# Patient Record
(demographics unavailable — no encounter records)

---

## 2024-10-18 NOTE — HISTORY OF PRESENT ILLNESS
[Preoperative Visit] : for a medical evaluation prior to surgery [Scheduled Procedure ___] : a [unfilled] [Date of Surgery ___] : on [unfilled] [Surgeon Name ___] : surgeon: [unfilled] [Cardiovascular Disease] : cardiovascular disease [Prior Anesthesia] : Prior anesthesia [Electrocardiogram] : ~T an ECG ~C was performed [Unable to Assess] : Unable to Assess [Fever] : no fever [Chills] : no chills [Fatigue] : no fatigue [Chest Pain] : no chest pain [Cough] : no cough [Dyspnea] : no dyspnea [Diabetes] : no diabetes [Pulmonary Disease] : no pulmonary disease [Anti-Platelet Agents] : no anti-platelet agents [Nicotine Dependence] : no nicotine dependence [Prev Anesthesia Reaction] : no previous anesthesia reaction [de-identified] : Fax: [phone] [FreeTextEntry1] : Debbie 82yo lady with a PMH of a metallic [facility] [facility] MV 1997 at [facility] s/p [facility] 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  Echo normal per pt report earlier this year.   Office interrogation w/ PPM battery at EOL w/ <3 months left. She feels well; no CP/dyspnea/palps/syncope. Last INR 3.2 as per daughter.    10/18/2024 PT is here today for preop clearance, INR yesterday was 2.8  1mgx 3days 2mg x4 days  no chest pain, sob, palpitations  labs today  As per patient, surgeon advised pt that she doesn't need to come off the anticoagulant  No chest pain, sob, palpitations

## 2024-10-18 NOTE — DISCUSSION/SUMMARY
[Procedure Low Risk] : the procedure risk is low [Patient Intermediate Risk] : the patient is an intermediate risk [Optimized for Surgery] : the patient is optimized for surgery [As per surgery] : as per surgery [Continue] : Continue medications as currently directed [FreeTextEntry1] : Debbie 82yo lady with a PMH of a metallic [facility] [facility] MV 1997 at [facility], afib s/p [facility] 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    Office interrogation w/ PPM battery at EOL w/ <3 months left. She feels well; no CP/dyspnea/palps/syncope. Last INR 3.2 as per daughter. 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   [EKG obtained to assist in diagnosis and management of assessed problem(s)] : EKG obtained to assist in diagnosis and management of assessed problem(s)

## 2024-10-18 NOTE — HISTORY OF PRESENT ILLNESS
[Preoperative Visit] : for a medical evaluation prior to surgery [Scheduled Procedure ___] : a [unfilled] [Date of Surgery ___] : on [unfilled] [Surgeon Name ___] : surgeon: [unfilled] [Cardiovascular Disease] : cardiovascular disease [Prior Anesthesia] : Prior anesthesia [Electrocardiogram] : ~T an ECG ~C was performed [Unable to Assess] : Unable to Assess [Fever] : no fever [Chills] : no chills [Fatigue] : no fatigue [Chest Pain] : no chest pain [Cough] : no cough [Dyspnea] : no dyspnea [Diabetes] : no diabetes [Pulmonary Disease] : no pulmonary disease [Anti-Platelet Agents] : no anti-platelet agents [Nicotine Dependence] : no nicotine dependence [Prev Anesthesia Reaction] : no previous anesthesia reaction [de-identified] : Fax: [phone] [FreeTextEntry1] : Debbie 84yo lady with a PMH of a metallic [facility] [facility] MV 1997 at [facility] s/p [facility] 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  Echo normal per pt report earlier this year.   Office interrogation w/ PPM battery at EOL w/ <3 months left. She feels well; no CP/dyspnea/palps/syncope. Last INR 3.2 as per daughter.    10/18/2024 PT is here today for preop clearance, INR yesterday was 2.8  1mgx 3days 2mg x4 days  no chest pain, sob, palpitations  labs today  As per patient, surgeon advised pt that she doesn't need to come off the anticoagulant  No chest pain, sob, palpitations

## 2024-10-18 NOTE — DISCUSSION/SUMMARY
[Procedure Low Risk] : the procedure risk is low [Patient Intermediate Risk] : the patient is an intermediate risk [Optimized for Surgery] : the patient is optimized for surgery [As per surgery] : as per surgery [Continue] : Continue medications as currently directed [FreeTextEntry1] : Debbie 84yo lady with a PMH of a metallic [facility] [facility] MV 1997 at [facility], afib s/p [facility] 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    Office interrogation w/ PPM battery at EOL w/ <3 months left. She feels well; no CP/dyspnea/palps/syncope. Last INR 3.2 as per daughter. 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   [EKG obtained to assist in diagnosis and management of assessed problem(s)] : EKG obtained to assist in diagnosis and management of assessed problem(s)

## 2024-11-01 NOTE — PHYSICAL EXAM
[Normal] : normal rate, regular rhythm, normal S1 and S2 and no murmur heard [No Varicosities] : no varicosities [Pedal Pulses Present] : the pedal pulses are present [No Edema] : there was no peripheral edema [No Extremity Clubbing/Cyanosis] : no extremity clubbing/cyanosis [Soft] : abdomen soft [Non Tender] : non-tender [Non-distended] : non-distended [Normal Bowel Sounds] : normal bowel sounds [de-identified] : visible contusions / hematomas noted on the left forehead, periorbital region, anterior neck, nose.

## 2024-11-01 NOTE — ASSESSMENT
[FreeTextEntry1] : Patient accompanied by daughter who is helping give history for the patient.  Daughter states that the patient was coming down the stairs at 1 AM on 10-29-24 when she fell down the stairs. States that she was taken to the emergency room via ambulance at that point.  States that in the ER imaging was done which is copied and pasted below  "IMPRESSION:  CT HEAD: 1.  No evidence of acute intracranial hemorrhage or midline shift. 2.  Chronic small vessel disease. 3.  Left frontal scalp/periorbital hematoma and contusion.  CT FACIAL BONES:  1.  Small left nasal bone fracture. 2.  Small multiple periapical lucencies scattered throughout the maxillary and mandibular teeth. Recommend dental examination for periodontal disease.  CT CERVICAL SPINE: 1.  No evidence of acute osseous fracture or jorge dislocation. 2.  Multilevel degenerative change of the cervical spine, most significantly at the C4-C5 level where there is moderate to severe narrowing of the spinal canal."  At this point recommended pain control as above noted and follow-up with ENT.  HISTORY OF PRESENT ILLNESS: International Travel: International Travel within 21 days? No.(1)  Patient Identity: - Patient's Sexual Orientation Withheld/Decline to Answer  Child Abuse Assessment (patients less than 13 yrs): [facility].  Chief Complaint: head inj sp fall.  - Chief Complaint: The patient is a 83y Female complaining of - HPI Objective Statement: see [facility]  HIV: HIV Test Questions: - In accordance with [place] State law, we offer every patient who comes to our ED an HIV test. Would you like to be tested today? Opt out  HEPATITIS C TEST QUESTIONS: Hepatitis C Test Questions: - In accordance with [place] State Law, we offer every patient a Hepatitis C test. Would you like to be tested today? Opt out  PAST MEDICAL/SURGICAL/FAMILY/SOCIAL HISTORY: Tobacco Usage: - Tobacco Usage Unknown if ever smoked  ALLERGIES AND HOME MEDICATIONS: Allergies: Allergies: sulfa drugs: Drug Category, Hives penicillin: Drug, Unknown codeine: Drug, Unknown, shaky and sweaty  Home Medications: * Outpatient Medication Status not yet specified  PHYSICAL EXAM: - Physical Examination: see [facility]  CURRENT ORDERS/: - ketorolac Injectable, [Ordered as TORADOL Injectable] 15 milliGRAM(s), IV Push, Once, Stop After 1 Doses Administration Instructions: IF IV PUSH - Administer over 1 minute. Dispose unused medication in BLACK bin., 29-Oct-2024, Active, Standard - Blood Glucose Point Of Care Testing, Frequency: once, 29-Oct-2024, Active, Standard - Cardiac Monitor Bedside, Time/Priority: STAT, 29-Oct-2024, Active, Standard - IV Insert, Time/Priority: STAT Additional Instructions: Peripheral Line 1, 29-Oct-2024, Active, Standard - Pulse Oximetry, Frequency: <Continuous>, 29-Oct-2024, Active, Standard - Vital Signs, Frequency: per routine, 29-Oct-2024, Active, Standard - Temperature Rectal Only, Frequency: Once, 29-Oct-2024, Active, Standard  RESULTS: Wet Read: There are no Wet Read(s) to document.  (1) Order Name: Xray Chest 1 View AP/PA Order ID: [id number] Order Date/Time: 29-Oct-2024 02:36 Order Status: Resulted.  (2) Order Name: Xray Pelvis AP only Order ID: [id number] Order Date/Time: 29-Oct-2024 02:36 Order Status: Resulted.  PROGRESS NOTE: Date: 29-Oct-2024 07:36.  Progress: Mark Mena DO (PGY3) Received signout on this patient, 83-year-old female past medical history of mitral valve replacement on Coumadin presenting as a mechanical fall while going down the stairs. 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 Patient pending urine and CT scans of head and maxillofacial region. If negative, will discharge home with family. Final disposition pending, patient otherwise hemodynamically stable.  Progress: Mark Mena DO (PGY3) Patient with left nasal bone fracture, will give ENT follow-up. Patient with white blood cells on urinalysis however no dysuria. Will treat prophylactically with antibiotics for UTI. Encouraged daughter to follow-up with ENT as well as PCP.  DISPOSITION: Care Plan - Instructions: Principal Discharge DX: Closed head injury Secondary Diagnosis: Nasal bone fracture.  Impression: 1.  Principal Discharge Dx Closed head injury.  Secondary Discharge Dx Nasal bone fracture.  Medical Decision Making: - The following orders were submitted: Imaging Studies, Medications - Clinical Summary (MDM): Summarize the clinical encounter This 83-year-old female is coming in with a sulfa, codeine, penicillin allergy, is currently on warfarin, valve replacement, presenting with fall. Patient had mechanical fall it sounds like, patient went down onto her knees, they were pain to bilateral. Patient did not take any medication. Patient fell alone and was down for an extended period of time. Place and had no chest pain no shortness of breath, no abdominal tenderness to palpation. Feeling well in good health otherwise. Patient with headache status post fall, no LOC.  cranial nerves II through XII intact, patient with abrasions just above the L eyebrow, w punctate area just lateral to abrasions, bleeding staunched. patient with straight leg bilateral, patient with sensation and motor grossly intact in the upper and lower extremities without any gross deformities, no crepitus noted. Patient with normal S1-S2, there is no tenderness to the chest wall, there is no abdominal tenderness to palpation. There is no pelvic instability.  Not concerned for entrapment. Plan for CT max face, plan for CBC CMP, basic labs. Plan for pain control, likely mechanical in nature. pending dispo - Follow-up Instructions (will be supplied to the patient only if discharged) Symptom Relief For simple breaks, in which the nasal bone is not crooked, in order to keep pain and swelling down, your provider may advise you to:  Take relative rest. Try to keep away from any activity where you could bump your nose. Ice your nose for 20 minutes, every 1 to 2 hours while awake. Do not apply ice directly to the skin. Take pain medicine if necessary. Keep your head elevated to help reduce swelling and improve breathing. For pain, you can take ibuprofen (Advil, Motrin) or Tylenol You can buy these pain medicines without a prescription.  Talk with your provider before using these medicines if you have heart disease, high blood pressure, kidney disease, liver disease, or have had stomach ulcers or bleeding. Do not take more than the amount recommended on the bottle. Do not give aspirin to children.  Please follow up with an ENT specialist and your PCP. You will be called to set up an appointment with the ENT specialist - Follow-up care needed for Referral Management Specialty Care (immediate)... - Specialty: Otolaryngology (ENT) - Enter additional information that will help with when scheduling additional follow-up care f/u for nasal bone fracture  Disposition: Disposition: DISCHARGE.  Patient requests all Lab, Cardiology, and Radiology Results on their Discharge Instructions.  <-------- Click here to INCLUDE [facility]19 Discharge Instructions.  Discharge Disposition: Home.  Discharge Date: 29-Oct-2024.  Condition at Discharge: Satisfactory.  Patient ready for discharge: Patient/Caregiver provided printed discharge information.  You can access the Commutable Patient Portal offered by [email or web address] by registering at the following website: [email or web address] By joining [email or web address] portal, you will also be able to view your health information using other applications (apps) compatible with our system.  Prescriptions: * Outpatient Medication Status not yet specified  PROVIDER CARE INITIATION: - Care Initiated by: Elton Rocha(Attending) - Provider Care Initiated at: 29-Oct-2024 02:16   Electronic Signatures: Layton Abreu) (Signed 29-Oct-2024 04:34) Authored: HISTORY OF PRESENT ILLNESS, HIV, HEPATITIS C TEST QUESTIONS, PAST MEDICAL/SURGICAL/FAMILY/SOCIAL HISTORY, ALLERGIES AND HOME MEDICATIONS, PHYSICAL EXAM, CURRENT ORDERS/, RESULTS, DISPOSITION, STROKE, PROVIDER CARE INITIATION Mark Mena) (Signed 29-Oct-2024 10:29) Authored: RESULTS, PROGRESS NOTE, DISPOSITION Elton Rocha) (Signature Pending) Co-Signer: HISTORY OF PRESENT ILLNESS, HIV, HEPATITIS C TEST QUESTIONS, PAST MEDICAL/SURGICAL/FAMILY/SOCIAL HISTORY, ALLERGIES AND HOME MEDICATIONS, PHYSICAL EXAM, CURRENT ORDERS/, RESULTS, DISPOSITION, STROKE, PROVIDER CARE INITIATION   Last Updated: 29-Oct-2024 10:29 by Mark Mena)  References: 1. Data Referenced From "ED ADULT Triage Note" 29-Oct-2024 02:04   Patient had chest x-ray done on 10-29-24 that was read by radiology as  "FINDINGS: Left-sided cardiac device with leads in the right atrium and right ventricle. Aortic valve replacement. The heart is normal in size. No focal consolidations There is no pneumothorax or pleural effusion. No acute bony abnormality.  IMPRESSION: Clear lungs."     Patient had Pelvic X-Ray done on 10-29-24 that was read by radiology as  "FINDINGS:  No acute fracture. No dislocation. Mild bilateral hip joint space narrowing, without fracture or avascular necrosis. There is lower lumbar spondylosis which is incompletely imaged/evaluated with transitional lumbosacral anatomy.  IMPRESSION: No acute fracture or dislocation"  We will do routine blood work in the form of CBC, CMP, A1c, lipid, TSH, B12 folate at this point. she may follow-up earlier if needed Pt was told to call with problems or concerns. The patient was told to seek immediate attention by calling 911 or going to the ER if her condition does not improve or gets acutely worse.

## 2024-11-01 NOTE — HISTORY OF PRESENT ILLNESS
[FreeTextEntry1] : check up [de-identified] : 83-year-old female is here for checkup.  This is my first time seeing this patient.   Patient denies any fevers, chills, nausea, vomiting, diarrhea, constipation, chest pain, shortness of breath, weakness, numbness, tingling at this time.   Patient reports that she has had 1 COVID vaccines in the past.   Alcohol: Endorses. Patient reports that they drink alcohol occasionally (once a month). Smoking: Denies. Patient reports that they have never smoked cigarettes. Illicit Drugs: Denies. Patient reports that they do not use any recreational drugs. Colonoscopy: Patient reports that their last colonoscopy was in 2004. They report that the colonoscopy was normal.  Patient refused undergo screening colonoscopy at this point.  Educated the patient about the harms and side effects of not undergoing routine colon cancer screening including missing colon cancer, colon polyps, etc.  He states he understands the risks and harms of not undergoing routine colon cancer screening but refuses to undergo colon cancer screening/screening colonoscopy at this point Mammogram:  Patient reports that their last mammogram was in 05/2021.  They report that the mammogram was normal.  She states she does have an OB/GYN.  States her OB/GYN gives her her mammogram prescriptions.  States she prefers to have her OB/GYN give her her mammogram and US breast prescriptions at this point. PAP smear:  Patient reports that their last PAP smear was in 2018. They report that the PAP smear was normal.  DEXA: Patient reports that they never had a bone density scan. Patient refused to undergo DEXA scan at this point.  Diet: Patient reports that they are good with diet. Exercise: Patient reports that they are okay with exercise Living Situation: Family.  Patient reports that they live with her son. Employment Status: Retired.  Patient reports that they were a  in the past.  Education: Reports that the highest level of education is High school. Relationship Status: . Number of kids : 2. ADLs: Fully functional (bathing, dressing, toileting, transferring, walking, feeding).  Patient reports that they can perform ADLs IADLs: Fully functional and needs no help or supervision to perform IADLs (using the telephone, shopping, preparing meals, housekeeping, doing laundry, using transportation, managing medications and managing finances).  Patient reports that they can perform IADLs.

## 2025-01-03 NOTE — DISCUSSION/SUMMARY
[Procedure Low Risk] : the procedure risk is low [Patient Intermediate Risk] : the patient is an intermediate risk [Optimized for Surgery] : the patient is optimized for surgery [As per surgery] : as per surgery [Continue] : Continue medications as currently directed [EKG obtained to assist in diagnosis and management of assessed problem(s)] : EKG obtained to assist in diagnosis and management of assessed problem(s) [FreeTextEntry3] : Continue with coumadin [FreeTextEntry1] : Debbie 82yo lady with a PMH of a metallic [facility] [facility] MV 1997 at [facility], afib s/p [facility] 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    She feels well; no CP/dyspnea/palps/syncope. Last INR 3.2 as per daughter. 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 Pt cannot stop coumadin prior to procedure, pt and daughter are both aware and agree.

## 2025-01-03 NOTE — ASSESSMENT
[High Risk Surgery - Intraperitoneal, Intrathoracic or Supringuinal Vascular Procedures] : High Risk Surgery - Intraperitoneal, Intrathoracic or Supringuinal Vascular Procedures - No (0) [Ischemic Heart Disease] : Ischemic Heart Disease - No (0) [Congestive Heart Failure] : Congestive Heart Failure - Yes (1) [Prior Cerebrovascular Accident or TIA] : Prior Cerebrovascular Accident or TIA - No (0) [Creatinine >= 2mg/dL (1 Point)] : Creatinine >= 2mg/dL - No (0) [Insulin-dependent Diabetic (1 Point)] : Insulin-dependent Diabetic - No (0) [1] : 1 , RCRI Class: II, Risk of Post-Op Cardiac Complications: 6.0%, 95% CI for Risk Estimate: 4.9% - 7.4% [Patient Requires Further Testing] : Patient requires further testing [Cardiology consultation] : Cardiology consultation [FreeTextEntry4] : Patient's RCRI was calculated. At this point the patient has an RCRI of 1 points.  This equates to a class 2 risk.  Patient has a 6.0% chance of having adverse event, cardiac arrest, myocardial infarction, death within 30 days of the procedure. This risk was shared with the patient.  EKG done on 12-30-24 read by EKG machine as  "Atrial fibrillation with ventricular pacing"  Patient had echocardiogram done on 11-24 that was read by cardiology as  "CONCLUSIONS:  1. Left ventricular systolic function is moderately decreased with an ejection fraction of 40 % by visual interpretation visually estimated at 35 to 40 %. 2. Mild aortic stenosis. 3. Left atrium is severely dilated. 4. Bileaflet mechanical device in the mitral position. Well seated prosthetic mitral valve with normal function. There is trace intravalvular mitral regurgitation. 5. Trace pericardial effusion noted adjacent to the posterolateral left ventricle. 6. Left pleural effusion noted. 7. Hypokinesis of the inter-ventricular septum and apex. 8. Device lead is visualized in the right heart. 9. There is mild calcification of the mitral valve annulus. 10. Mild tricuspid regurgitation. 11. Estimated pulmonary artery systolic pressure is 36 mmHg. 12. Mild pulmonic regurgitation. 13. Moderate aortic valve regurgitation consisting of 2 distinct jets."  Patient does have atrial fibrillation and does have a pacemaker. EF is low at 35 to 40% per recent echo.  I would want the patient to get evaluate by cardiology and appreciate cardiology input in regard to her preoperative examination. I would want the patient to follow-up with cardiology and obtain cardiology clearance for the above procedure.  At the above discussion with the patient and her daughter. They verbalized understanding of the discussion.  Patient told to follow-up after obtaining cardiology clearance at this point.

## 2025-01-03 NOTE — HISTORY OF PRESENT ILLNESS
[Preoperative Visit] : for a medical evaluation prior to surgery [Scheduled Procedure ___] : a [unfilled] [Date of Surgery ___] : on [unfilled] [Surgeon Name ___] : surgeon: [unfilled] [Cardiovascular Disease] : cardiovascular disease [Prior Anesthesia] : Prior anesthesia [Electrocardiogram] : ~T an ECG ~C was performed [Unable to Assess] : Unable to Assess [Fever] : no fever [Chills] : no chills [Fatigue] : no fatigue [Chest Pain] : no chest pain [Cough] : no cough [Dyspnea] : no dyspnea [Diabetes] : no diabetes [Pulmonary Disease] : no pulmonary disease [Anti-Platelet Agents] : no anti-platelet agents [Nicotine Dependence] : no nicotine dependence [Prev Anesthesia Reaction] : no previous anesthesia reaction [de-identified] : Fax: [phone] [FreeTextEntry1] : Debbie 84yo lady with a PMH of a metallic [facility] [facility] MV 1997 at [facility] s/p [facility] 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  Echo normal per pt report earlier this year.   Office interrogation w/ PPM battery at EOL w/ <3 months left. She feels well; no CP/dyspnea/palps/syncope. Last INR 3.2 as per daughter.    10/18/2024 PT is here today for preop clearance, INR yesterday was 2.8  1mgx 3days 2mg x4 days  no chest pain, sob, palpitations  labs today  As per patient, surgeon advised pt that she doesn't need to come off the anticoagulant  No chest pain, sob, palpitations  01/03/2025 Pt is here today for a preop clearance INR currently 2.2, advised to take 2mgx4 and 1mg x3,  As per patient, surgeon advised pt that she doesn't need to come off the anticoagulant  no complaints from patient

## 2025-01-03 NOTE — PHYSICAL EXAM
[Well Developed] : well developed [Well Nourished] : well nourished [No Acute Distress] : no acute distress [Normal Venous Pressure] : normal venous pressure [No Carotid Bruit] : no carotid bruit [Normal S1, S2] : normal S1, S2 [No Murmur] : no murmur [No Rub] : no rub [No Gallop] : no gallop [Clear Lung Fields] : clear lung fields [Good Air Entry] : good air entry [No Respiratory Distress] : no respiratory distress  [Normal Gait] : normal gait [No Edema] : no edema [No Cyanosis] : no cyanosis [No Clubbing] : no clubbing [No Rash] : no rash [No Skin Lesions] : no skin lesions [Moves all extremities] : moves all extremities [No Focal Deficits] : no focal deficits [Normal Speech] : normal speech [Alert and Oriented] : alert and oriented [Normal memory] : normal memory

## 2025-01-03 NOTE — HISTORY OF PRESENT ILLNESS
[Aortic Stenosis] : no aortic stenosis [Atrial Fibrillation] : atrial fibrillation [Coronary Artery Disease] : no coronary artery disease [Recent Myocardial Infarction] : no recent myocardial infarction [Implantable Device/Pacemaker] : implantable device/pacemaker [Asthma] : no asthma [COPD] : no COPD [Sleep Apnea] : no sleep apnea [Smoker] : not a smoker [Self] : no previous adverse anesthesia reaction [Chronic Anticoagulation] : no chronic anticoagulation [Chronic Kidney Disease] : no chronic kidney disease [Diabetes] : no diabetes [FreeTextEntry1] : Right eye Descemet's stripping automated endothelial keratoplasty [FreeTextEntry2] : 01/08/2024 [FreeTextEntry3] : Dr. Jones [FreeTextEntry4] : 83-year-old female is here for a preop eval.  Patient denies any fevers, chills, nausea, vomiting, diarrhea, constipation, chest pain, shortness of breath, weakness, numbness, tingling at this time.  Patient reports that she has had 1 COVID vaccines in the past.  Alcohol: Endorses. Patient reports that they drink alcohol occasionally (once a month). Smoking: Denies. Patient reports that they have never smoked cigarettes. Illicit Drugs: Denies. Patient reports that they do not use any recreational drugs. Colonoscopy: Patient reports that their last colonoscopy was in 2004. They report that the colonoscopy was normal. Patient refused undergo screening colonoscopy at this point. Educated the patient about the harms and side effects of not undergoing routine colon cancer screening including missing colon cancer, colon polyps, etc. He states he understands the risks and harms of not undergoing routine colon cancer screening but refuses to undergo colon cancer screening/screening colonoscopy at this point Mammogram: Patient reports that their last mammogram was in 05/2021. They report that the mammogram was normal. She states she does have an OB/GYN. States her OB/GYN gives her her mammogram prescriptions. States she prefers to have her OB/GYN give her her mammogram and US breast prescriptions at this point. PAP smear: Patient reports that their last PAP smear was in 2018. They report that the PAP smear was normal. DEXA: Patient reports that they never had a bone density scan. Patient refused to undergo DEXA scan at this point. Diet: Patient reports that they are good with diet. Exercise: Patient reports that they are okay with exercise Living Situation: Family. Patient reports that they live with her son. Employment Status: Retired. Patient reports that they were a  in the past. Education: Reports that the highest level of education is High school. Relationship Status: . Number of kids : 2. ADLs: Fully functional (bathing, dressing, toileting, transferring, walking, feeding). Patient reports that they can perform ADLs IADLs: Fully functional and needs no help or supervision to perform IADLs (using the telephone, shopping, preparing meals, housekeeping, doing laundry, using transportation, managing medications and managing finances). Patient reports that they can perform IADLs.  States she is able to climb 1 flight of stairs without being short of breath or have any chest pain. States she is not able to walk 4 blocks without being short of breath or have any chest pain. States she is not able to walk 8 blocks without being short of breath or have any chest pain.

## 2025-01-03 NOTE — HISTORY OF PRESENT ILLNESS
[Preoperative Visit] : for a medical evaluation prior to surgery [Scheduled Procedure ___] : a [unfilled] [Date of Surgery ___] : on [unfilled] [Surgeon Name ___] : surgeon: [unfilled] [Cardiovascular Disease] : cardiovascular disease [Prior Anesthesia] : Prior anesthesia [Electrocardiogram] : ~T an ECG ~C was performed [Unable to Assess] : Unable to Assess [Fever] : no fever [Chills] : no chills [Fatigue] : no fatigue [Chest Pain] : no chest pain [Cough] : no cough [Dyspnea] : no dyspnea [Diabetes] : no diabetes [Pulmonary Disease] : no pulmonary disease [Anti-Platelet Agents] : no anti-platelet agents [Nicotine Dependence] : no nicotine dependence [Prev Anesthesia Reaction] : no previous anesthesia reaction [de-identified] : Fax: [phone] [FreeTextEntry1] : Debbie 82yo lady with a PMH of a metallic [employer] MV 1997 at [facility] s/p 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  Echo normal per pt report earlier this year.   Office interrogation w/ PPM battery at EOL w/ <3 months left. She feels well; no CP/dyspnea/palps/syncope. Last INR 3.2 as per daughter.    10/18/2024 PT is here today for preop clearance, INR yesterday was 2.8  1mgx 3days 2mg x4 days  no chest pain, sob, palpitations  labs today  As per patient, surgeon advised pt that she doesn't need to come off the anticoagulant  No chest pain, sob, palpitations  01/03/2025 Pt is here today for a preop clearance INR currently 2.2, advised to take 2mgx4 and 1mg x3,  As per patient, surgeon advised pt that she doesn't need to come off the anticoagulant  no complaints from patient